# Patient Record
(demographics unavailable — no encounter records)

---

## 2025-03-27 NOTE — HISTORY OF PRESENT ILLNESS
[FreeTextEntry1] : npv; blisters [de-identified] : YANELY SIMPSON is a pleasant 76 year F who presents for the following:  #Blisters, abdomen, back x >6 months. No treatments tried. Saw primary doctor yesterday was given mupirocin and triamcinolone lotion, has not started. Noted new eruptions and progression. Very itchy. Thinks it started out as bug bites. Denies involvement of mouth or groin.  _________________  No p/f history of skin cancer.

## 2025-03-27 NOTE — PHYSICAL EXAM
[FreeTextEntry3] : General: well appearing person in nad, alert, pleasant Focused Skin Exam per patient preference: scattered ovoid hyperpigmented papules and macules/patches on shoulders, back and trunk  scattered eroded plaques on the lower abdomen upper arms with erythematous crusted papules no oral ulcers, eyes clear

## 2025-03-27 NOTE — ASSESSMENT
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 Upper arm perilesional  -- f/u BP antibodies  -- f/u dsg 1 & 3 -- start clobetasol 0.05% ointment BID to affected areas. x 2 weeks on, 1 week off SED. not for face, groin, armpits -- can use mupirocin ointment as prescribed by PMD to open eroded areas  #Neoplasm of uncertain behavior of skin x2 Punch biopsy procedure note x2- 1 for H&E and 1 for DIF Punch biopsy diameter: 4mm Sutures used: 4-0 chromic Alternatives to this procedure, benefits of, and risks including bleeding, scarring, and infection were explained to the patient. Informed consent was documented and a consent form was signed by the patient and surgeon. The lesion was cleaned with alcohol and anesthetized with 1% lidocaine with epinephrine. A sterile punch instrument was used to incise a circular piece of tissue into the superficial subcutaneous plane, and the wound was closed with sutures above. The specimen was sent to pathology. Vaseline and a bandage were applied. The patient tolerated the procedure well with minimal blood loss and no complications. Post-op instructions were given. There were no pre-op or post-op medications. The patient was instructed to keep the biopsy site dry and covered for 24 hours. After that, they may wash gently the area daily with mild soap and water and apply clean vaseline and a Bandaid until it has healed. The patient was advised to contact us if they develop persistent bleeding, redness, swelling, increasing pain, or pus draining from the site. Patient's phone number was obtained for reporting of results. Patient agreed to leaving of message with results if phone is not answered.   #xerosis, chronic -- gentle skin care, liberal bland emollients  Will call with results and plan

## 2025-03-27 NOTE — HISTORY OF PRESENT ILLNESS
[FreeTextEntry1] : npv; blisters [de-identified] : YANELY SIMPSON is a pleasant 76 year F who presents for the following:  #Blisters, abdomen, back x >6 months. No treatments tried. Saw primary doctor yesterday was given mupirocin and triamcinolone lotion, has not started. Noted new eruptions and progression. Very itchy. Thinks it started out as bug bites. Denies involvement of mouth or groin.  _________________  No p/f history of skin cancer.

## 2025-06-19 NOTE — PHYSICAL EXAM
[FreeTextEntry3] : General: well appearing person in nad, alert, pleasant Focused Skin Exam per patient preference: scattered ovoid hyperpigmented papules and macules/patches on shoulders, back and trunk  scattered eroded papules on shoulders no oral ulcers, eyes clear

## 2025-06-19 NOTE — ASSESSMENT
[FreeTextEntry1] : #prurigo nodularis, chronic, improved but not at treatment goal - s/p biopsy 3/2025 - gentle skin care, emollients - c/w betamethasone  ointment BID x 2 weeks, then TAKE 1 WEEK BREAK. do not use continously, SED; not for face, groin, armpits - not interested in dupi or injections - can use mupirocin ointment to any open areas  #xerosis, chronic, flare -- gentle skin care, liberal bland emollients- cerave, cetaphil  RTC 3-4 months

## 2025-06-19 NOTE — HISTORY OF PRESENT ILLNESS
[FreeTextEntry1] : rpv- prurigo [de-identified] : 76yoF here for f/u prurigo- improved with betamethasone, using every day, not moisturizing. improved  _________________  No p/f history of skin cancer.

## 2025-07-03 NOTE — PROCEDURE
[de-identified] : Procedure: Transnasal flexible laryngoscopy Description: Informed consent was obtained from the patient prior to the procedure. The patient was seated in the clinic chair. Topical anesthesia was achieved by first spraying the nasal cavities with 4% lidocaine and 1% phenylephrine.   Exam: Clear vallecula, crisp epiglottis. Aryepiglottic folds: intact and symmetric bilaterally Hypopharynx: No pooling Interarytenoid space: No lesions or pachydermia False vocal folds: Symmetric and without lesions or masses. False fold voicing (plica ventricularis) is not noted True vocal folds: normal and symmetric motion bilaterally. No paradoxical motion. Right medial edge is crisp and shows no lesions or masses. Left medial edge is crisp and shows no lesions or masses. Mucosal covering is minimally edematous. No erythema. No obvious vascular ectasias. Vocal processes do not demonstrate granulomas. Subglottis/proximal trachea: clear and unobstructed to the limits of the examination today.

## 2025-07-03 NOTE — PROCEDURE
[de-identified] : Procedure: Transnasal flexible laryngoscopy Description: Informed consent was obtained from the patient prior to the procedure. The patient was seated in the clinic chair. Topical anesthesia was achieved by first spraying the nasal cavities with 4% lidocaine and 1% phenylephrine.   Exam: Clear vallecula, crisp epiglottis. Aryepiglottic folds: intact and symmetric bilaterally Hypopharynx: No pooling Interarytenoid space: No lesions or pachydermia False vocal folds: Symmetric and without lesions or masses. False fold voicing (plica ventricularis) is not noted True vocal folds: normal and symmetric motion bilaterally. No paradoxical motion. Right medial edge is crisp and shows no lesions or masses. Left medial edge is crisp and shows no lesions or masses. Mucosal covering is minimally edematous. No erythema. No obvious vascular ectasias. Vocal processes do not demonstrate granulomas. Subglottis/proximal trachea: clear and unobstructed to the limits of the examination today.

## 2025-07-03 NOTE — HISTORY OF PRESENT ILLNESS
[de-identified] : YANELY SIMPSON  is a 76 year old woman who presents to the [facility] with a chief complaint of throat pain. Hx of esophagitis 2 years ago. They have had pain in their throat for about 3 weeks, mostly left side. They do have a prior CT neck - done at [facility] They do not have prior smoking history. They do not have prior history of alcoholism/alcohol abuse. Throat pain is absent when swallowing solids or liquids. They do not have weight loss in the past 3 months. They do not have to alter their diet because of this pain. They do not have frequent ear pain. Pain is absent when chewing. They do have a prior swallow study in the past 1 year. - done at [facility] They have seen the following types of providers for this problem: [facility] They have taken the following medications for this problem: pain relievers Doing or taking the following makes the pain better: nothing Doing the following makes the pain worse: hard foods Was in [facility] for 3 days. Was discharged 2 weeks.  At that time was admitted for inability to tolerate PO.  Underwent "swallow test," Ct scan of unk part of body, and EGD. Patient reports these were normal.  Was prescribed medication but does not know the name of it. Is still taking it. They do not have any of these records.  Pain currently only on left when doing certain swallowing. States now only has mild dull pain approx 2-3 times a week when swallows.

## 2025-07-03 NOTE — HISTORY OF PRESENT ILLNESS
[de-identified] : YANELY SIMPSON  is a 76 year old woman who presents to the [facility] with a chief complaint of throat pain. Hx of esophagitis 2 years ago. They have had pain in their throat for about 3 weeks, mostly left side. They do have a prior CT neck - done at [facility] They do not have prior smoking history. They do not have prior history of alcoholism/alcohol abuse. Throat pain is absent when swallowing solids or liquids. They do not have weight loss in the past 3 months. They do not have to alter their diet because of this pain. They do not have frequent ear pain. Pain is absent when chewing. They do have a prior swallow study in the past 1 year. - done at [facility] They have seen the following types of providers for this problem: [facility] They have taken the following medications for this problem: pain relievers Doing or taking the following makes the pain better: nothing Doing the following makes the pain worse: hard foods Was in [facility] for 3 days. Was discharged 2 weeks.  At that time was admitted for inability to tolerate PO.  Underwent "swallow test," Ct scan of unk part of body, and EGD. Patient reports these were normal.  Was prescribed medication but does not know the name of it. Is still taking it. They do not have any of these records.  Pain currently only on left when doing certain swallowing. States now only has mild dull pain approx 2-3 times a week when swallows.

## 2025-07-03 NOTE — ASSESSMENT
[FreeTextEntry1] : Assessment/Plan:  #1 Odynophagia #2 Hx of inability to tolerate PO #3 Hx of esophagitis   Will follow up with outside GI. No ENT concern on exam. May follow up as needed.  Total time: 30 minutes; total time does not include time spent performing the procedure and its related elements. It does include all other face to face and non face to face pre and post visit tasks performed on the same date of service.